# Patient Record
Sex: FEMALE | Race: WHITE | NOT HISPANIC OR LATINO | URBAN - METROPOLITAN AREA
[De-identification: names, ages, dates, MRNs, and addresses within clinical notes are randomized per-mention and may not be internally consistent; named-entity substitution may affect disease eponyms.]

---

## 2022-06-23 ENCOUNTER — EMERGENCY (EMERGENCY)
Facility: HOSPITAL | Age: 87
LOS: 0 days | Discharge: HOME | End: 2022-06-23
Attending: STUDENT IN AN ORGANIZED HEALTH CARE EDUCATION/TRAINING PROGRAM | Admitting: STUDENT IN AN ORGANIZED HEALTH CARE EDUCATION/TRAINING PROGRAM
Payer: MEDICARE

## 2022-06-23 VITALS
SYSTOLIC BLOOD PRESSURE: 187 MMHG | HEART RATE: 67 BPM | TEMPERATURE: 97 F | DIASTOLIC BLOOD PRESSURE: 93 MMHG | OXYGEN SATURATION: 99 % | RESPIRATION RATE: 17 BRPM
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
## 2022-06-23 DIAGNOSIS — M53.3 SACROCOCCYGEAL DISORDERS, NOT ELSEWHERE CLASSIFIED: ICD-10-CM

## 2022-06-23 DIAGNOSIS — R51.9 HEADACHE, UNSPECIFIED: ICD-10-CM

## 2022-06-23 LAB
ALBUMIN SERPL ELPH-MCNC: 4.5 G/DL — SIGNIFICANT CHANGE UP (ref 3.5–5.2)
ALP SERPL-CCNC: 60 U/L — SIGNIFICANT CHANGE UP (ref 30–115)
ALT FLD-CCNC: 21 U/L — SIGNIFICANT CHANGE UP (ref 0–41)
ANION GAP SERPL CALC-SCNC: 13 MMOL/L — SIGNIFICANT CHANGE UP (ref 7–14)
APPEARANCE UR: CLEAR — SIGNIFICANT CHANGE UP
AST SERPL-CCNC: 37 U/L — SIGNIFICANT CHANGE UP (ref 0–41)
BASOPHILS # BLD AUTO: 0.05 K/UL — SIGNIFICANT CHANGE UP (ref 0–0.2)
BASOPHILS NFR BLD AUTO: 0.6 % — SIGNIFICANT CHANGE UP (ref 0–1)
BILIRUB SERPL-MCNC: 0.5 MG/DL — SIGNIFICANT CHANGE UP (ref 0.2–1.2)
BILIRUB UR-MCNC: NEGATIVE — SIGNIFICANT CHANGE UP
BUN SERPL-MCNC: 22 MG/DL — HIGH (ref 10–20)
CALCIUM SERPL-MCNC: 10.2 MG/DL — HIGH (ref 8.5–10.1)
CHLORIDE SERPL-SCNC: 103 MMOL/L — SIGNIFICANT CHANGE UP (ref 98–110)
CO2 SERPL-SCNC: 21 MMOL/L — SIGNIFICANT CHANGE UP (ref 17–32)
COLOR SPEC: SIGNIFICANT CHANGE UP
CREAT SERPL-MCNC: 0.8 MG/DL — SIGNIFICANT CHANGE UP (ref 0.7–1.5)
DIFF PNL FLD: NEGATIVE — SIGNIFICANT CHANGE UP
EGFR: 70 ML/MIN/1.73M2 — SIGNIFICANT CHANGE UP
EOSINOPHIL # BLD AUTO: 0.12 K/UL — SIGNIFICANT CHANGE UP (ref 0–0.7)
EOSINOPHIL NFR BLD AUTO: 1.4 % — SIGNIFICANT CHANGE UP (ref 0–8)
ETHANOL SERPL-MCNC: <10 MG/DL — SIGNIFICANT CHANGE UP
GLUCOSE SERPL-MCNC: 95 MG/DL — SIGNIFICANT CHANGE UP (ref 70–99)
GLUCOSE UR QL: NEGATIVE — SIGNIFICANT CHANGE UP
HCT VFR BLD CALC: 43.2 % — SIGNIFICANT CHANGE UP (ref 37–47)
HGB BLD-MCNC: 14.5 G/DL — SIGNIFICANT CHANGE UP (ref 12–16)
IMM GRANULOCYTES NFR BLD AUTO: 1.3 % — HIGH (ref 0.1–0.3)
KETONES UR-MCNC: NEGATIVE — SIGNIFICANT CHANGE UP
LACTATE SERPL-SCNC: 1.1 MMOL/L — SIGNIFICANT CHANGE UP (ref 0.7–2)
LEUKOCYTE ESTERASE UR-ACNC: ABNORMAL
LIDOCAIN IGE QN: 48 U/L — SIGNIFICANT CHANGE UP (ref 7–60)
LYMPHOCYTES # BLD AUTO: 2.25 K/UL — SIGNIFICANT CHANGE UP (ref 1.2–3.4)
LYMPHOCYTES # BLD AUTO: 25.8 % — SIGNIFICANT CHANGE UP (ref 20.5–51.1)
MCHC RBC-ENTMCNC: 31.5 PG — HIGH (ref 27–31)
MCHC RBC-ENTMCNC: 33.6 G/DL — SIGNIFICANT CHANGE UP (ref 32–37)
MCV RBC AUTO: 93.7 FL — SIGNIFICANT CHANGE UP (ref 81–99)
MONOCYTES # BLD AUTO: 0.53 K/UL — SIGNIFICANT CHANGE UP (ref 0.1–0.6)
MONOCYTES NFR BLD AUTO: 6.1 % — SIGNIFICANT CHANGE UP (ref 1.7–9.3)
NEUTROPHILS # BLD AUTO: 5.67 K/UL — SIGNIFICANT CHANGE UP (ref 1.4–6.5)
NEUTROPHILS NFR BLD AUTO: 64.8 % — SIGNIFICANT CHANGE UP (ref 42.2–75.2)
NITRITE UR-MCNC: NEGATIVE — SIGNIFICANT CHANGE UP
NRBC # BLD: 0 /100 WBCS — SIGNIFICANT CHANGE UP (ref 0–0)
PH UR: 6 — SIGNIFICANT CHANGE UP (ref 5–8)
PLATELET # BLD AUTO: 219 K/UL — SIGNIFICANT CHANGE UP (ref 130–400)
POTASSIUM SERPL-MCNC: 5.6 MMOL/L — HIGH (ref 3.5–5)
POTASSIUM SERPL-SCNC: 5.6 MMOL/L — HIGH (ref 3.5–5)
PROT SERPL-MCNC: 7.1 G/DL — SIGNIFICANT CHANGE UP (ref 6–8)
PROT UR-MCNC: SIGNIFICANT CHANGE UP
RBC # BLD: 4.61 M/UL — SIGNIFICANT CHANGE UP (ref 4.2–5.4)
RBC # FLD: 14.5 % — SIGNIFICANT CHANGE UP (ref 11.5–14.5)
SARS-COV-2 RNA SPEC QL NAA+PROBE: SIGNIFICANT CHANGE UP
SODIUM SERPL-SCNC: 137 MMOL/L — SIGNIFICANT CHANGE UP (ref 135–146)
SP GR SPEC: 1.01 — SIGNIFICANT CHANGE UP (ref 1.01–1.03)
TROPONIN T SERPL-MCNC: <0.01 NG/ML — SIGNIFICANT CHANGE UP
UROBILINOGEN FLD QL: SIGNIFICANT CHANGE UP
WBC # BLD: 8.73 K/UL — SIGNIFICANT CHANGE UP (ref 4.8–10.8)
WBC # FLD AUTO: 8.73 K/UL — SIGNIFICANT CHANGE UP (ref 4.8–10.8)

## 2022-06-23 PROCEDURE — 99285 EMERGENCY DEPT VISIT HI MDM: CPT | Mod: GC

## 2022-06-23 PROCEDURE — 74176 CT ABD & PELVIS W/O CONTRAST: CPT | Mod: 26,MA

## 2022-06-23 PROCEDURE — 72125 CT NECK SPINE W/O DYE: CPT | Mod: 26,MA

## 2022-06-23 PROCEDURE — 71250 CT THORAX DX C-: CPT | Mod: 26,MA

## 2022-06-23 PROCEDURE — 70450 CT HEAD/BRAIN W/O DYE: CPT | Mod: 26,MA

## 2022-06-23 PROCEDURE — 93010 ELECTROCARDIOGRAM REPORT: CPT

## 2022-06-23 PROCEDURE — 71045 X-RAY EXAM CHEST 1 VIEW: CPT | Mod: 26

## 2022-06-23 PROCEDURE — 72170 X-RAY EXAM OF PELVIS: CPT | Mod: 26

## 2022-06-23 NOTE — ED PROVIDER NOTE - PROGRESS NOTE DETAILS
Resident AO: Pt received as s/o from Dr. Bailey, pending work up. Resident AO: All labs and imaging reviewed - patient able to ambulate with one person assist (ambulates with walker at baseline). Spoke with family member Ashlee, who will come  the patient. Patient and family comfortable with discharge, and agreed with outpatient follow-up. Return precautions given.

## 2022-06-23 NOTE — ED PROVIDER NOTE - NSFOLLOWUPINSTRUCTIONS_ED_ALL_ED_FT
Your visit in the emergency department today did not reveal anything immediately life-threatening.    However, it is important that you follow-up with your PRIMARY CARE PHYSICIAN within 3-5 days.    ---------------------------------------------------------------------------------------------------    Falls    Your risk of falling increases as you grow older. That's because getting older can make it harder to walk steadily and keep your balance. Also, the effects of falls are more serious in older people. If you have fallen in the past, you are at higher risk of falling again.    Several things can increase your risk of a fall, including:  - Illness(es)  - A change in the medicines you take  - An unsafe or unfamiliar setting (for example, a room with rugs or furniture that might trip you, or an area you don't know well)    Is there anything I can do on my own?    Yes. To help keep from falling, you can:    - Make your home safer:   To avoid falling at home, get rid of things that might make you trip or slip. This might include furniture, electrical cords, clutter, and loose rugs. Keep your home well-lit so that you can easily see where you are going. Avoid storing things in high places so you don't have to reach or climb.    - Wear sturdy shoes that fit well:  Walking around in bare feet, or only socks, can also increase your risk of falling.    - Use a cane, walker, and other safety devices: If your doctor recommends that you use a cane or walker, be sure that it's the right size and you know how to use it. There are other devices that might help you avoid falling, too. These include grab bars or a sturdy seat for the shower, non-slip bath mats, and hand rails or treads for the stairs (to prevent slipping).    If you worry that you could fall, there are also alarm buttons that let you call for help if you fall and can't get up.    What should I do if I fall?  If you fall, see your doctor right away, even if you aren't hurt. Your doctor can try to figure out what caused you to fall, and how likely you are to fall again. He or she will do an exam and talk to you about your health problems, medicines, and activities. Then he or she can suggest things you can do to avoid falling again.    Many older people have a hard time recovering after a fall. Doing things to prevent falling can help you to protect your health and independence.

## 2022-06-23 NOTE — ED ADULT TRIAGE NOTE - CHIEF COMPLAINT QUOTE
Pt BIBA. tripped and fell from standing. fell and hit her head. laceration to back of the head. complaining of headache and sacral pain. denies LOC. denies blood thinners

## 2022-06-23 NOTE — ED PROVIDER NOTE - PROVIDER TOKENS
FREE:[LAST:[Your primary care doctor],PHONE:[(   )    -],FAX:[(   )    -],FOLLOWUP:[Urgent],ESTABLISHEDPATIENT:[T]]

## 2022-06-23 NOTE — ED PROVIDER NOTE - PATIENT PORTAL LINK FT
You can access the FollowMyHealth Patient Portal offered by [facility] by registering at the following website: [email or web address]. By joining [facility]’s FollowMyHealth portal, you will also be able to view your health information using other applications (apps) compatible with our system.

## 2022-06-23 NOTE — ED PROVIDER NOTE - CARE PROVIDER_API CALL
Your primary care doctor,   Phone: (   )    -  Fax: (   )    -  Established Patient  Follow Up Time: Urgent

## 2022-06-23 NOTE — ED PROVIDER NOTE - OBJECTIVE STATEMENT
89 year old female with phmx of ___ comes in for fall. pt had a mechanical fall and tripped and fell and hit the back of her head. pt is now complaining of head and sacral pain. pt has no other complaints, denies loc, ac. pt has no other complaints. 89 year old female with phmx of htn comes in for fall. pt had a mechanical fall and tripped and fell and hit the back of her head. pt is now complaining of head and sacral pain. pt has no other complaints, denies loc, ac. pt has no other complaints.

## 2022-06-23 NOTE — ED PROVIDER NOTE - ATTENDING CONTRIBUTION TO CARE
89 yo f hx htn presents for eval s/p fall. pt had trip and fall earlier today. pt hit back of head and buttock.  pt c/o pain to back of head and sacrum. no n/v/confusion/change in mental status/numbness/weakness. no preceding cp, sob, palpitations, lightheadedness.       VS  A: intact, protected  B: breath sounds equal b/l, no cyanosis  C: extremities warm and well perfused  D: GCS 15  E:    H: abrasion to back of head  E: TMs clear- no hemotympanum  N: no nasal septal hematoma  T: oropharynx clear  Card: RRR, nml s1s2  Pulm: CTAB, breath sounds equal  Abd: S, NT, ND  Spine: no C/T/L spine TTP  Pelvis: stable  Extremities: no gross deformities  Neuro: Awake, alert, orientedx3, no gross focal neuro neuro deficits, moving all extremities    Plan:  Trauma labs  CXR, Pelvis XR  CT panscan    Dispo pending w/u

## 2022-06-23 NOTE — ED PROVIDER NOTE - PHYSICAL EXAMINATION
CONSTITUTIONAL: Well-developed; well-nourished; in no acute distress.   SKIN: warm, dry  HEAD: Normocephalic; abrasion to back of head  EYES: PERRL, EOMI, normal sclera and conjunctiva   ENT: No nasal discharge; airway clear.  NECK: Supple; non tender.  CARD:  Regular rate and rhythm.   RESP: NO inc WOB   ABD: soft ntnd  EXT: Normal ROM.    LYMPH: No acute cervical adenopathy.  NEURO: Alert, oriented, grossly unremarkable  PSYCH: Cooperative, appropriate.